# Patient Record
Sex: MALE | Race: BLACK OR AFRICAN AMERICAN | NOT HISPANIC OR LATINO | Employment: FULL TIME | URBAN - METROPOLITAN AREA
[De-identification: names, ages, dates, MRNs, and addresses within clinical notes are randomized per-mention and may not be internally consistent; named-entity substitution may affect disease eponyms.]

---

## 2021-05-12 ENCOUNTER — HOSPITAL ENCOUNTER (OUTPATIENT)
Dept: RADIATION ONCOLOGY | Facility: HOSPITAL | Age: 61
Setting detail: RADIATION/ONCOLOGY SERIES
Discharge: HOME | End: 2021-05-12

## 2021-05-12 ENCOUNTER — OFFICE VISIT (OUTPATIENT)
Dept: RADIATION ONCOLOGY | Facility: HOSPITAL | Age: 61
End: 2021-05-12

## 2021-05-12 VITALS
HEART RATE: 81 BPM | BODY MASS INDEX: 26.07 KG/M2 | OXYGEN SATURATION: 98 % | RESPIRATION RATE: 16 BRPM | TEMPERATURE: 96.6 F | WEIGHT: 176 LBS | DIASTOLIC BLOOD PRESSURE: 77 MMHG | SYSTOLIC BLOOD PRESSURE: 140 MMHG | HEIGHT: 69 IN

## 2021-05-12 DIAGNOSIS — C61 PROSTATE CANCER (HCC): Primary | ICD-10-CM

## 2021-05-12 LAB — 25(OH)D3 SERPL-MCNC: 38.4 NG/ML

## 2021-05-12 PROCEDURE — G0463 HOSPITAL OUTPT CLINIC VISIT: HCPCS

## 2021-05-12 PROCEDURE — 82306 VITAMIN D 25 HYDROXY: CPT | Performed by: RADIOLOGY

## 2021-05-12 NOTE — PROGRESS NOTES
CONSULTATION NOTE      :                                                          1960  DATE OF CONSULTATION:                       2021   REQUESTING PHYSICIAN:                   Aguila Joseph MD  REASON FOR CONSULTATION:           Prostate cancer (CMS/HCC)  - Stage IIA (cT1c, cN0, cM0, PSA: 17.9, Grade Group: 1)         BRIEF HISTORY:  The patient is a very pleasant 60 y.o. male  with recently diagnosed prostate cancer.  He has been followed for the past year for BPH and elevated PSA values.  Most recent PSA values have increased from 16.8 ng/ml on 2020 to maximum 17.9 ng/ml on 2021.  DUNG revealed no nodules.  Biopsy performed 2021 showed prostatic adenocarcinoma, [place] score 3+3 = 6, involving 3 out of 6 cores from the left lobe.  Tumor occupied 1 to 20% of submitted tissue samples.  Right lobe was benign but contained rare atypical glands.  CT abdomen pelvis showed an enlarged prostate but no definite evidence of extraprostatic spread.  No pathologic lymphadenopathy.  No evidence of metastasis.  Patient is interested in definitive treatment and is healthy enough to do so.  He is very active, works full-time as a , and exercises 4 days weekly running sprints.  [facility] healthy life expectancy calculator predicts an additional 32.7-year longevity is possible.    Allergy: No Known Allergies    Social History:   Social History     Socioeconomic History   • Marital status:      Spouse name: Not on file   • Number of children: Not on file   • Years of education: Not on file   • Highest education level: Not on file   Tobacco Use   • Smoking status: Never Smoker   • Smokeless tobacco: Never Used   Substance and Sexual Activity   • Alcohol use: Never   • Drug use: Never   • Sexual activity: Defer       Past Medical History:   Past Medical History:   Diagnosis Date   • Prostate cancer (CMS/HCC)        Family History: family history includes Diabetes in his brother  and father; Heart disease in his brother and mother.     Surgical History:   Past Surgical History:   Procedure Laterality Date   • APPENDECTOMY     • COLONOSCOPY  2016   • VASECTOMY  1998        Review of Systems:   Review of Systems   All other systems reviewed and are negative.             IPSS Questionnaire (AUA-7):  Over the past month…    1)  Incomplete Emptying  How often have you had a sensation of not emptying your bladder?  3 - About half the time   2)  Frequency  How often have you had to urinate less than every two hours? 4 - More than half the time   3)  Intermittency  How often have you found you stopped and started again several times when you urinated?  0 - Not at all   4) Urgency  How often have you found it difficult to postpone urination?  2 - Less than half the time   5) Weak Stream  How often have you had a weak urinary stream?  4 - More than half the time   6) Straining  How often have you had to push or strain to begin urination?  0 - Not at all   7) Nocturia  How many times did you typically get up at night to urinate?  1 - 1 time   Total Score:  14       Quality of life due to urinary symptoms:  If you were to spend the rest of your life with your urinary condition the way it is now, how would you feel about that? 3-Mixed   Urine Leakage (Incontinence) 0-No Leakage     Sexual Health Inventory  Current Status    1)  How do you rate your confidence that you could achieve and keep an erection? 5-Very High   2) When you had erections with sexual stimulation, how often were your erections hard enough for penetration (entering your partner)? 5-Almost always or always   3)  During sexual intercourse, how often were you able to maintain your erection after you had penetrated (entered) into your partner? 4-Most times (much more than half the time)   4) During sexual intercourse, how difficult was it to maintain your erection to completion of intercourse? 5-Not difficult   5) When you attempted sexual  "intercourse, how often was it satisfactory to you? 5-Almost always or always   Total Score: 24       Bowel Health Inventory  Current Status: 0-No problems, no rectal bleeding, no discharge, less then 5 bowel movements a day           Objective   VITAL SIGNS:   Vitals:    05/12/21 1017   BP: 140/77   Pulse: 81   Resp: 16   Temp: 96.6 °F (35.9 °C)   SpO2: 98%  Comment: RA   Weight: 79.8 kg (176 lb)   Height: 175.3 cm (69\")   PainSc: 0-No pain        Karnofsky score: 90      Physical Exam:   Physical Exam  Vitals and nursing note reviewed.   Constitutional:       Appearance: He is well-developed.   HENT:      Head: Normocephalic and atraumatic.   Cardiovascular:      Rate and Rhythm: Normal rate and regular rhythm.      Heart sounds: Normal heart sounds. No murmur heard.     Pulmonary:      Effort: Pulmonary effort is normal.      Breath sounds: Normal breath sounds. No wheezing or rales.   Abdominal:      General: Bowel sounds are normal. There is no distension.      Palpations: Abdomen is soft.      Tenderness: There is no abdominal tenderness.   Genitourinary:     Prostate: Enlarged ( Symmetrically enlarged, estimated to 50-60 cc volume, smooth surface, no nodules.  Seminal vesicles are nonpalpable.). Not tender and no nodules present.      Rectum: No mass, tenderness, external hemorrhoid or internal hemorrhoid. Normal anal tone.   Musculoskeletal:         General: No tenderness. Normal range of motion.      Cervical back: Normal range of motion and neck supple.   Lymphadenopathy:      Cervical: No cervical adenopathy.      Upper Body:      Right upper body: No supraclavicular adenopathy.      Left upper body: No supraclavicular adenopathy.   Skin:     General: Skin is warm and dry.   Neurological:      Mental Status: He is alert and oriented to person, place, and time.      Sensory: No sensory deficit.   Psychiatric:         Behavior: Behavior normal.         Thought Content: Thought content normal.         " Judgment: Judgment normal.              The following portions of the patient's history were reviewed and updated as appropriate: allergies, current medications, past family history, past medical history, past social history, past surgical history and problem list.    Assessment:   Assessment      Prostate cancer, Ma[place] score 3+3=6, clinical stage IIA (T1c, N0, M0), PSA 17.9 ng/ml.  He has intermediate risk disease based on PSA value but he has lower grade tumor.  With presence of neoplasm in 3 cores and at a relatively young age, definitive treatment would be preferred over active surveillance.  Prognosis should still be very good.  We reviewed the radiation treatment options of IMRT, SBRT and brachytherapy.  He would like to proceed with stereotactic body radiotherapy.  The CyberKnife treatment procedure was reviewed in detail today.  Informed consent was obtained.    RECOMMENDATIONS: He will return to Dr. Joseph for placement of gold seed fiducials.  He will subsequently return here for treatment planning CT and MRI pelvis.  The prostate gland and proximal seminal vesicles will receive 5 fractions of 7 Gray each, delivered daily, on the CyberKnife.    Follow Up:   Return in about 2 weeks (around 5/26/2021) for Office Visit, Simulation.  Diagnoses and all orders for this visit:    1. Prostate cancer (CMS/HCC) (Primary)  -     Ambulatory Referral to Gastroenterology  -     Vitamin D 25 Hydroxy; Future  -     Vitamin D 25 Hydroxy         Chandu Leyva MD

## 2021-05-13 ENCOUNTER — TELEPHONE (OUTPATIENT)
Dept: RADIATION ONCOLOGY | Facility: HOSPITAL | Age: 61
End: 2021-05-13

## 2021-05-13 NOTE — TELEPHONE ENCOUNTER
Left message explaining that vitamin d level was normal and if he had any questions to call back.

## 2021-05-18 RX ORDER — SODIUM, POTASSIUM,MAG SULFATES 17.5-3.13G
2 SOLUTION, RECONSTITUTED, ORAL ORAL TAKE AS DIRECTED
Qty: 354 ML | Refills: 0 | Status: SHIPPED | OUTPATIENT
Start: 2021-05-18 | End: 2021-06-15

## 2021-05-19 ENCOUNTER — OUTSIDE FACILITY SERVICE (OUTPATIENT)
Dept: GASTROENTEROLOGY | Facility: CLINIC | Age: 61
End: 2021-05-19

## 2021-05-19 DIAGNOSIS — C61 PROSTATE CANCER (HCC): Primary | ICD-10-CM

## 2021-05-19 PROCEDURE — 45380 COLONOSCOPY AND BIOPSY: CPT | Performed by: INTERNAL MEDICINE

## 2021-05-25 ENCOUNTER — TELEPHONE (OUTPATIENT)
Dept: RADIATION ONCOLOGY | Facility: HOSPITAL | Age: 61
End: 2021-05-25

## 2021-05-25 NOTE — TELEPHONE ENCOUNTER
Pt left message asking about treatment dates and times.  I called back and spoke to pt's wife and explained that I cannot give them treatment dates at this time.  I explained typically it is 2-3 weeks after the dates of the scans.  Wife verbalized understanding.

## 2021-06-15 ENCOUNTER — HOSPITAL ENCOUNTER (OUTPATIENT)
Dept: RADIATION ONCOLOGY | Facility: HOSPITAL | Age: 61
Setting detail: RADIATION/ONCOLOGY SERIES
Discharge: HOME | End: 2021-06-15

## 2021-06-15 ENCOUNTER — OFFICE VISIT (OUTPATIENT)
Dept: RADIATION ONCOLOGY | Facility: HOSPITAL | Age: 61
End: 2021-06-15

## 2021-06-15 ENCOUNTER — HOSPITAL ENCOUNTER (OUTPATIENT)
Dept: MRI IMAGING | Facility: HOSPITAL | Age: 61
Discharge: HOME | End: 2021-06-15
Admitting: RADIOLOGY

## 2021-06-15 ENCOUNTER — HOSPITAL ENCOUNTER (OUTPATIENT)
Dept: RADIATION ONCOLOGY | Facility: HOSPITAL | Age: 61
Discharge: HOME | End: 2021-06-15

## 2021-06-15 VITALS
OXYGEN SATURATION: 96 % | BODY MASS INDEX: 25.4 KG/M2 | TEMPERATURE: 97.7 F | DIASTOLIC BLOOD PRESSURE: 83 MMHG | WEIGHT: 172 LBS | HEART RATE: 74 BPM | SYSTOLIC BLOOD PRESSURE: 151 MMHG | RESPIRATION RATE: 20 BRPM

## 2021-06-15 DIAGNOSIS — C61 PROSTATE CANCER (HCC): Primary | ICD-10-CM

## 2021-06-15 DIAGNOSIS — C61 PROSTATE CANCER (HCC): ICD-10-CM

## 2021-06-15 PROCEDURE — G0463 HOSPITAL OUTPT CLINIC VISIT: HCPCS

## 2021-06-15 PROCEDURE — 72195 MRI PELVIS W/O DYE: CPT

## 2021-06-15 RX ORDER — TAMSULOSIN HYDROCHLORIDE 0.4 MG/1
1 CAPSULE ORAL NIGHTLY
Qty: 30 CAPSULE | Refills: 11 | Status: SHIPPED | OUTPATIENT
Start: 2021-06-15 | End: 2021-09-07

## 2021-06-15 NOTE — PROGRESS NOTES
RE-EVALUATION    PATIENT:                                                      Anthony Franco  :                                                          1960  DATE:                          6/15/2021   DIAGNOSIS:     Prostate cancer (CMS/[facility])  - Stage IIA (cT1c, cN0, cM0, PSA: 17.9, Grade Group: 1)         BRIEF HISTORY:  The patient is a very pleasant 60 y.o. male  with prostate cancer.  He does undergo definitive treatment with stereotactic body radiotherapy.  He underwent placement of gold seed potentials without difficulty change in voiding.  He underwent screening colonoscopy which was negative and not due again for another 10 years.  He has had no other change in health since informed consent was obtained on 2021 and remains a candidate for SBRT.  He is here today for simulation.  He has followed all recommended dietary changes and prep.    No Known Allergies    Review of Systems   All other systems reviewed and are negative.           IPSS Questionnaire (AUA-7):  Over the past month…     1)  Incomplete Emptying  How often have you had a sensation of not emptying your bladder?  3 - About half the time   2)  Frequency  How often have you had to urinate less than every two hours? 4 - More than half the time   3)  Intermittency  How often have you found you stopped and started again several times when you urinated?  0 - Not at all   4) Urgency  How often have you found it difficult to postpone urination?  2 - Less than half the time   5) Weak Stream  How often have you had a weak urinary stream?  4 - More than half the time   6) Straining  How often have you had to push or strain to begin urination?  0 - Not at all   7) Nocturia  How many times did you typically get up at night to urinate?  1 - 1 time   Total Score:  14         Quality of life due to urinary symptoms:  If you were to spend the rest of your life with your urinary condition the way it is now, how would you feel about that? 3-Mixed    Urine Leakage (Incontinence) 0-No Leakage      Sexual Health Inventory  Current Status     1)  How do you rate your confidence that you could achieve and keep an erection? 5-Very High   2) When you had erections with sexual stimulation, how often were your erections hard enough for penetration (entering your partner)? 5-Almost always or always   3)  During sexual intercourse, how often were you able to maintain your erection after you had penetrated (entered) into your partner? 4-Most times (much more than half the time)   4) During sexual intercourse, how difficult was it to maintain your erection to completion of intercourse? 5-Not difficult   5) When you attempted sexual intercourse, how often was it satisfactory to you? 5-Almost always or always   Total Score: 24         Bowel Health Inventory  Current Status: 0-No problems, no rectal bleeding, no discharge, less then 5 bowel movements a day                   Objective   VITAL SIGNS:   Vitals:    06/15/21 1243   BP: 151/83   Pulse: 74   Resp: 20   Temp: 97.7 °F (36.5 °C)   TempSrc: Temporal   SpO2: 96%   Weight: 78 kg (172 lb)   PainSc: 0-No pain        KPS 90%    Physical Exam  Constitutional:       Appearance: Normal appearance. He is normal weight.   HENT:      Head: Normocephalic and atraumatic.   Cardiovascular:      Rate and Rhythm: Normal rate and regular rhythm.   Pulmonary:      Effort: Pulmonary effort is normal.      Breath sounds: Normal breath sounds.   Abdominal:      General: Abdomen is flat.   Musculoskeletal:         General: Normal range of motion.      Cervical back: Normal range of motion.   Skin:     General: Skin is warm and dry.   Neurological:      General: No focal deficit present.      Mental Status: He is alert and oriented to person, place, and time.   Psychiatric:         Mood and Affect: Mood normal.         Behavior: Behavior normal.         Thought Content: Thought content normal.         Judgment: Judgment normal.               The following portions of the patient's history were reviewed and updated as appropriate: allergies, current medications, past family history, past medical history, past social history, past surgical history and problem list.    Diagnoses and all orders for this visit:    Prostate cancer (CMS/HCC)    Other orders  -     tamsulosin (FLOMAX) 0.4 MG capsule 24 hr capsule; Take 1 capsule by mouth Every Night.      IMPRESSION:  Prostate cancer, Maite score 3+3=6, clinical stage IIA (T1c, N0, M0), PSA 17.9 ng/ml.    RECOMMENDATIONS: Treatment planning CT and MRI pelvis will be performed today.  Prostate gland and proximal seminal vesicles will receive 5 fractions of 7 Gy, delivered daily, on the CyberKnife.         Chandu Leyva MD

## 2021-06-28 PROCEDURE — 77300 RADIATION THERAPY DOSE PLAN: CPT | Performed by: RADIOLOGY

## 2021-06-28 PROCEDURE — 77338 DESIGN MLC DEVICE FOR IMRT: CPT | Performed by: RADIOLOGY

## 2021-06-28 PROCEDURE — 77301 RADIOTHERAPY DOSE PLAN IMRT: CPT | Performed by: RADIOLOGY

## 2021-07-20 ENCOUNTER — HOSPITAL ENCOUNTER (OUTPATIENT)
Dept: RADIATION ONCOLOGY | Facility: HOSPITAL | Age: 61
Discharge: HOME | End: 2021-07-20

## 2021-07-20 ENCOUNTER — HOSPITAL ENCOUNTER (OUTPATIENT)
Dept: RADIATION ONCOLOGY | Facility: HOSPITAL | Age: 61
Setting detail: RADIATION/ONCOLOGY SERIES
Discharge: HOME | End: 2021-07-20

## 2021-07-20 PROCEDURE — 77373 STRTCTC BDY RAD THER TX DLVR: CPT | Performed by: RADIOLOGY

## 2021-07-21 ENCOUNTER — HOSPITAL ENCOUNTER (OUTPATIENT)
Dept: RADIATION ONCOLOGY | Facility: HOSPITAL | Age: 61
Discharge: HOME | End: 2021-07-21

## 2021-07-21 PROCEDURE — 77373 STRTCTC BDY RAD THER TX DLVR: CPT | Performed by: RADIOLOGY

## 2021-07-22 ENCOUNTER — HOSPITAL ENCOUNTER (OUTPATIENT)
Dept: RADIATION ONCOLOGY | Facility: HOSPITAL | Age: 61
Discharge: HOME | End: 2021-07-22

## 2021-07-22 PROCEDURE — 77373 STRTCTC BDY RAD THER TX DLVR: CPT | Performed by: RADIOLOGY

## 2021-07-23 ENCOUNTER — HOSPITAL ENCOUNTER (OUTPATIENT)
Dept: RADIATION ONCOLOGY | Facility: HOSPITAL | Age: 61
Discharge: HOME | End: 2021-07-23

## 2021-07-23 PROCEDURE — 77373 STRTCTC BDY RAD THER TX DLVR: CPT | Performed by: RADIOLOGY

## 2021-07-26 ENCOUNTER — HOSPITAL ENCOUNTER (OUTPATIENT)
Dept: RADIATION ONCOLOGY | Facility: HOSPITAL | Age: 61
Discharge: HOME | End: 2021-07-26

## 2021-07-26 PROCEDURE — 77373 STRTCTC BDY RAD THER TX DLVR: CPT | Performed by: RADIOLOGY

## 2021-07-26 PROCEDURE — 77336 RADIATION PHYSICS CONSULT: CPT | Performed by: RADIOLOGY

## 2021-09-07 ENCOUNTER — HOSPITAL ENCOUNTER (OUTPATIENT)
Dept: RADIATION ONCOLOGY | Facility: HOSPITAL | Age: 61
Setting detail: RADIATION/ONCOLOGY SERIES
Discharge: HOME | End: 2021-09-07

## 2021-09-07 ENCOUNTER — OFFICE VISIT (OUTPATIENT)
Dept: RADIATION ONCOLOGY | Facility: HOSPITAL | Age: 61
End: 2021-09-07

## 2021-09-07 DIAGNOSIS — C61 PROSTATE CANCER (HCC): Primary | ICD-10-CM

## 2021-09-07 NOTE — PROGRESS NOTES
TELEMEDICINE FOLLOW UP NOTE    PATIENT:                                                      Anthony Franco  MEDICAL RECORD #:                        [id number]  :                                                          1960  COMPLETION DATE:   2021  DIAGNOSIS:     Prostate cancer (CMS/[facility])  - Stage IIA (cT1c, cN0, cM0, PSA: 17.9, Grade Group: 1)    This visit has been rescheduled as a phone visit to comply with patient safety concerns in accordance with CDC recommendations in light of the COVID-19 pandemic. Total time of discussion was 14 minutes.  Verbal consent given.      BRIEF HISTORY:    Initial follow-up visit for intermediate risk prostate cancer based on PSA value, with lower grade tumor.  He underwent definitive treatment with CyberKnife SBRT.  He tolerated treatment well.  He did not experience any posttreatment fatigue.  He reports brief duration of dysuria, managed conservatively with OTC Cystex plus.  This has since resolved.  He reports baseline moderate urinary outflow obstructive symptoms in the setting of chronic BPH have gradually improved.  He notes improvement overall in urinary urgency, frequency, emptying, and strength of stream.  No hematuria or incontinence.  He has already discontinued use of nightly Flomax.  Bowels are regular.  No new aches or pains.  Overall, he feels well.      IPSS Questionnaire (AUA-7):  Over the past month…    1)  Incomplete Emptying  How often have you had a sensation of not emptying your bladder?  2 - Less than half the time   2)  Frequency  How often have you had to urinate less than every two hours? 2 - Less than half the time   3)  Intermittency  How often have you found you stopped and started again several times when you urinated?  0 - Not at all   4) Urgency  How often have you found it difficult to postpone urination?  1 - Less than 1 time in 5   5) Weak Stream  How often have you had a weak urinary stream?  2 - Less than half the time   6)  Straining  How often have you had to push or strain to begin urination?  0 - Not at all   7) Nocturia  How many times did you typically get up at night to urinate?  1 - 1 time   Total Score:  8       Quality of life due to urinary symptoms:  If you were to spend the rest of your life with your urinary condition the way it is now, how would you feel about that? 2-Mostly Satisfied   Urine Leakage (Incontinence) 0-No Leakage     Sexual Health Inventory  Current Status    1)  How do you rate your confidence that you could achieve and keep an erection? 5-Very High   2) When you had erections with sexual stimulation, how often were your erections hard enough for penetration (entering your partner)? 5-Almost always or always   3)  During sexual intercourse, how often were you able to maintain your erection after you had penetrated (entered) into your partner? 4-Most times (much more than half the time)   4) During sexual intercourse, how difficult was it to maintain your erection to completion of intercourse? 5-Not difficult   5) When you attempted sexual intercourse, how often was it satisfactory to you? 5-Almost always or always   Total Score: 24       Bowel Health Inventory  Current Status: 0-No problems, no rectal bleeding, no discharge, less then 5 bowel movements a day             MEDICATIONS: Medication reconciliation for the patient was reviewed and confirmed in the electronic medical record.    Review of Systems   All other systems reviewed and are negative.      KPS 90%    Physical Exam  Pulmonary:      Respirations even, unlabored. No audible wheezing or cough.  Neurological:      A+Ox4, conversant, answers questions appropriately.  Psychiatric:     Judgement, affect, and decision-making WNL.    Limited physical exam as visit was conducted remotely via telephone in light of the COVID-19 pandemic.        The following portions of the patient's history were reviewed and updated as appropriate: allergies, current  medications, past family history, past medical history, past social history, past surgical history and problem list.         Diagnoses and all orders for this visit:    1. Prostate cancer (CMS/[facility]) (Primary)         IMPRESSION:  Prostate cancer, [place] score 3+3=6, clinical stage IIA (T1c, N0, M0), pretreatment PSA 17.9 ng/ml.  1 month status post CyberKnife SBRT.  He tolerated treatment well.    He has experienced clinical improvement in longstanding lower urinary tract symptoms and is no longer on alpha-blocker.    Mr. Franco and I have reviewed the survivorship care plan in detail.  We discussed diagnosis, follow-up intervals, PSA monitoring, and expectations for response to treatment.  A copy of the care plan has been mailed to the patient.  A copy has also been sent to his PCP.      RECOMMENDATIONS:  Mr. Franco continues urologic surveillance under the care of Dr. Joseph, with follow-up and repeat PSA scheduled 10/12/2021.    Return in about 6 months (around 3/7/2022) for Office Visit.    Sukhdeep Sun APRN

## 2022-03-10 ENCOUNTER — OFFICE VISIT (OUTPATIENT)
Dept: RADIATION ONCOLOGY | Facility: HOSPITAL | Age: 62
End: 2022-03-10

## 2022-03-10 ENCOUNTER — HOSPITAL ENCOUNTER (OUTPATIENT)
Dept: RADIATION ONCOLOGY | Facility: HOSPITAL | Age: 62
Setting detail: RADIATION/ONCOLOGY SERIES
Discharge: HOME | End: 2022-03-10

## 2022-03-10 VITALS
DIASTOLIC BLOOD PRESSURE: 85 MMHG | BODY MASS INDEX: 26.51 KG/M2 | OXYGEN SATURATION: 97 % | TEMPERATURE: 97.5 F | RESPIRATION RATE: 16 BRPM | HEIGHT: 69 IN | WEIGHT: 179 LBS | SYSTOLIC BLOOD PRESSURE: 135 MMHG | HEART RATE: 74 BPM

## 2022-03-10 DIAGNOSIS — C61 PROSTATE CANCER: Primary | ICD-10-CM

## 2022-03-10 PROCEDURE — G0463 HOSPITAL OUTPT CLINIC VISIT: HCPCS

## 2022-03-10 NOTE — PROGRESS NOTES
FOLLOW UP NOTE    PATIENT:                                                      Anthony Franco  MEDICAL RECORD #:                        [id number]  :                                                          1960  COMPLETION DATE:   2021  DIAGNOSIS:     Prostate cancer (HCC)  - Stage IIA (cT1c, cN0, cM0, PSA: 17.9, Grade Group: 1)      BRIEF HISTORY:    Routine follow-up visit for intermediate risk prostate cancer based on PSA value, with lower grade tumor.  He underwent definitive treatment with CyberKnife SBRT.  He tolerated treatment well.  His urinary outflow obstructive symptoms in the setting of chronic BPH have improved.    He used alpha-blocker only briefly and now takes no medications.    His wife reports 1 episode of a drop of blood noted in the commode; however, patient has not been aware of any other hematuria and he has no dysuria, urgency or frequency.  He has had no worsening of erectile function but does note diminished semen capacity.  Bowels are regular.  He is back to exercising vigorously.  No new aches or pains.  Overall, he feels well.    He believes he had a PSA drawn several months ago in Dr. Joseph's office and believes the value was close to 4 ng/mL.  We will try to verify that.  He is due for return visit Dr. Joseph next week with another PSA drawn at that visit.    MEDICATIONS: Medication reconciliation for the patient was reviewed and confirmed in the electronic medical record.    Review of Systems   All other systems reviewed and are negative.              IPSS Questionnaire (AUA-7):  Over the past month…     1)  Incomplete Emptying  How often have you had a sensation of not emptying your bladder?  0 -not at all   2)  Frequency  How often have you had to urinate less than every two hours? 0 -not at all   3)  Intermittency  How often have you found you stopped and started again several times when you urinated?  0 - Not at all   4) Urgency  How often have you found it difficult  to postpone urination?  0 -not at all   5) Weak Stream  How often have you had a weak urinary stream?  0 -not at all   6) Straining  How often have you had to push or strain to begin urination?  0 - Not at all   7) Nocturia  How many times did you typically get up at night to urinate?  1 - 1 time   Total Score:  1         Quality of life due to urinary symptoms:  If you were to spend the rest of your life with your urinary condition the way it is now, how would you feel about that? 1- pleased   Urine Leakage (Incontinence) 0-No Leakage      Sexual Health Inventory  Current Status     1)  How do you rate your confidence that you could achieve and keep an erection? 5-Very High   2) When you had erections with sexual stimulation, how often were your erections hard enough for penetration (entering your partner)? 5-Almost always or always   3)  During sexual intercourse, how often were you able to maintain your erection after you had penetrated (entered) into your partner? 4-Most times (much more than half the time)   4) During sexual intercourse, how difficult was it to maintain your erection to completion of intercourse? 5-Not difficult   5) When you attempted sexual intercourse, how often was it satisfactory to you? 5-Almost always or always   Total Score: 24         Bowel Health Inventory  Current Status: 0-No problems, no rectal bleeding, no discharge, less then 5 bowel movements a day                    Physical Exam  Vitals and nursing note reviewed.   Constitutional:       Appearance: He is well-developed.   HENT:      Head: Normocephalic and atraumatic.   Cardiovascular:      Rate and Rhythm: Normal rate and regular rhythm.      Heart sounds: Normal heart sounds. No murmur heard.  Pulmonary:      Effort: Pulmonary effort is normal.      Breath sounds: Normal breath sounds. No wheezing or rales.   Chest:   Breasts:      Right: No supraclavicular adenopathy.      Left: No supraclavicular adenopathy.  "      Abdominal:      General: Bowel sounds are normal. There is no distension.      Palpations: Abdomen is soft.      Tenderness: There is no abdominal tenderness.   Genitourinary:     Prostate: Enlarged ( 30 cc (previously 50 to 60 cc), smooth, round, soft, no nodules or induration.). Not tender and no nodules present.      Rectum: No mass, tenderness, external hemorrhoid or internal hemorrhoid. Normal anal tone.   Musculoskeletal:         General: No tenderness. Normal range of motion.      Cervical back: Normal range of motion and neck supple.   Lymphadenopathy:      Cervical: No cervical adenopathy.      Upper Body:      Right upper body: No supraclavicular adenopathy.      Left upper body: No supraclavicular adenopathy.   Skin:     General: Skin is warm and dry.   Neurological:      Mental Status: He is alert and oriented to person, place, and time.      Sensory: No sensory deficit.   Psychiatric:         Behavior: Behavior normal.         Thought Content: Thought content normal.         Judgment: Judgment normal.         VITAL SIGNS:   Vitals:    03/10/22 1038   BP: 135/85   Pulse: 74   Resp: 16   Temp: 97.5 °F (36.4 °C)   SpO2: 97%  Comment: RA   Weight: 81.2 kg (179 lb)   Height: 174 cm (68.5\")   PainSc: 0-No pain                   KSP %:      The following portions of the patient's history were reviewed and updated as appropriate: allergies, current medications, past family history, past medical history, past social history, past surgical history and problem list.         Diagnoses and all orders for this visit:    1. Prostate cancer (HCC) (Primary)         IMPRESSION:  Prostate cancer, Maite score 3+3=6, clinical stage IIA (T1c, N0, M0), pretreatment PSA 17.9 ng/ml.  7 months status post CyberKnife SBRT.  He tolerated treatment very well.    He has had a very good early partial clinical response and biochemical response with PSA reduction.  Prognosis remains excellent.    RECOMMENDATIONS: We will " try to verify the PSA result.  He will continue urology surveillance under the care of Dr. Joseph.  I would like to see him for 1 additional follow-up visit next year, until he reaches radha PSA.    I spent a total of 20 minutes on todays visit, with more than 15 minutes in direct face to face communication, and the remainder of the time spent in reviewing the relevant history, records, available imaging, and for documentation.    Return in about 1 year (around 3/10/2023) for Office Visit.    Chandu Leyva MD

## 2023-03-16 ENCOUNTER — HOSPITAL ENCOUNTER (OUTPATIENT)
Dept: RADIATION ONCOLOGY | Facility: HOSPITAL | Age: 63
Setting detail: RADIATION/ONCOLOGY SERIES
Discharge: HOME | End: 2023-03-16
Payer: COMMERCIAL

## 2024-03-06 ENCOUNTER — OFFICE VISIT (OUTPATIENT)
Dept: PULMONOLOGY | Facility: CLINIC | Age: 64
End: 2024-03-06
Payer: COMMERCIAL

## 2024-03-06 VITALS
DIASTOLIC BLOOD PRESSURE: 72 MMHG | HEIGHT: 69 IN | BODY MASS INDEX: 26.05 KG/M2 | TEMPERATURE: 98.2 F | OXYGEN SATURATION: 97 % | HEART RATE: 79 BPM | WEIGHT: 175.9 LBS | SYSTOLIC BLOOD PRESSURE: 126 MMHG

## 2024-03-06 DIAGNOSIS — R91.8 MULTIPLE PULMONARY NODULES: Primary | ICD-10-CM

## 2024-03-06 PROBLEM — Z78.9 NON-SMOKER: Status: ACTIVE | Noted: 2024-03-06

## 2024-03-06 NOTE — PROGRESS NOTES
"  PULMONARY  NOTE    Chief Complaint     Pulmonary nodules, incidental    History of Present Illness     63-year-old male referred for abnormal CT scan of the chest    He is a non-smoker with no past history of known lung disease  He does have a history of prostate cancer for which she has received radiation therapy    He experienced pneumonia and sepsis requiring hospitalization in October  He subsequently has improved from that hospital stay  He currently has no cough or sputum production  Has never had hemoptysis    Does not feel limited by shortness of breath and is on no respiratory medications    Based on abnormal chest x-ray had a CT scan of the chest and December at the [facility]  Those results are as noted below    To his recollection has never had a CT scan of the chest in the past    Patient Active Problem List   Diagnosis    Prostate cancer    Multiple pulmonary nodules (Max 6mm)    Non-smoker      No Known Allergies  No current outpatient medications on file.  MEDICATION LIST AND ALLERGIES REVIEWED.    Family History   Problem Relation Age of Onset    Heart disease Mother     Diabetes Father     Diabetes Brother     Heart disease Brother      Social History     Tobacco Use    Smoking status: Never    Smokeless tobacco: Never   Vaping Use    Vaping status: Never Used   Substance Use Topics    Alcohol use: Never    Drug use: Never     Social History     Social History Narrative        Non-smoker    Does not use any vaping products     FAMILY AND SOCIAL HISTORY REVIEWED.    Review of Systems  IF PRESENT REFER TO SCANNED ROS SHEET FROM SAME DATE  OTHERWISE ROS OBTAINED AND NON-CONTRIBUTORY OVER HPI.    /72   Pulse 79   Temp 98.2 °F (36.8 °C)   Ht 175.3 cm (69\")   Wt 79.8 kg (175 lb 14.4 oz)   SpO2 97%   BMI 25.98 kg/m²   Physical Exam    Results     PFTs reveal no airway obstruction, no restriction, normal diffusion capacity    CT scan of the chest from the [facility] from 12/28/2023 reviewed on PACS  No consolidation or effusions  No enlarged mediastinal or hilar adenopathy  A few calcified nodules the largest of which is a approximately 6 mm noncalcified right lower lobe    Immunization History   Administered Date(s) Administered    COVID-19 (PFIZER) Purple Cap Monovalent 03/18/2021, 04/08/2021, 10/28/2021    Covid-19 (Pfizer) Gray Cap Monovalent 07/20/2022    Shingrix 07/18/2023     Problem List       ICD-10-CM ICD-9-CM   1. Multiple pulmonary nodules (Max 6mm)  R91.8 793.19       Discussion     We reviewed his chest imaging on PACS  We reviewed his PFTs  I gave him literature on pulmonary nodules and management guidelines    I reassured him he has no evidence of obstructive or restrictive lung disease    We discussed Fleischner Society management guidelines  I recommended a short interval CT scan of the chest  They have a high level of anxiety about the pulmonary nodules given his history of prostate cancer so we will choose the shorter interval, 3 months    Will do it as a robotic CT scan so if there is any interval enlargement we could consider biopsy    The lesion is too small for any type of biopsy procedure and too small for PET imaging, as well    Moderate level of Medical Decision Making complexity based on 1 undiagnosed new problem or 2 stable chronic conditions, independent interpretation of tests, and/or prescription drug management     Isael Lorenz MD  Note electronically signed    CC: No primary care provider on file.

## 2024-03-07 ENCOUNTER — PATIENT ROUNDING (BHMG ONLY) (OUTPATIENT)
Dept: PULMONOLOGY | Facility: CLINIC | Age: 64
End: 2024-03-07
Payer: COMMERCIAL

## 2024-03-07 NOTE — PROGRESS NOTES
March 7, 2024    Hello, may I speak with Anthony Franco?    My name is [email or web address]       I am  with [facility]  [address] [place] [id number] [phone].    Before we get started may I verify your date of birth? 1960    I am calling to officially welcome you to our practice and ask about your recent visit. Is this a good time to talk? yes    Tell me about your visit with us. What things went well?  yes things went well, I was in and out, everyone was really nice, you guys did an outstanding job.       We're always looking for ways to make our patients' experiences even better. Do you have recommendations on ways we may improve?  no    Overall were you satisfied with your first visit to our practice? yes       I appreciate you taking the time to speak with me today. Is there anything else I can do for you? no      Thank you, and have a great day.